# Patient Record
Sex: FEMALE | Race: BLACK OR AFRICAN AMERICAN | NOT HISPANIC OR LATINO | URBAN - METROPOLITAN AREA
[De-identification: names, ages, dates, MRNs, and addresses within clinical notes are randomized per-mention and may not be internally consistent; named-entity substitution may affect disease eponyms.]

---

## 2022-04-07 ENCOUNTER — HISTORICAL (OUTPATIENT)
Dept: ADMINISTRATIVE | Facility: HOSPITAL | Age: 22
End: 2022-04-07

## 2022-04-24 VITALS
BODY MASS INDEX: 22.19 KG/M2 | DIASTOLIC BLOOD PRESSURE: 79 MMHG | HEIGHT: 65 IN | WEIGHT: 133.19 LBS | SYSTOLIC BLOOD PRESSURE: 117 MMHG

## 2022-09-16 DIAGNOSIS — R79.89 ELEVATED TSH: Primary | ICD-10-CM

## 2022-09-22 ENCOUNTER — OFFICE VISIT (OUTPATIENT)
Dept: OTOLARYNGOLOGY | Facility: CLINIC | Age: 22
End: 2022-09-22
Payer: MEDICAID

## 2022-09-22 VITALS
BODY MASS INDEX: 23.13 KG/M2 | HEART RATE: 82 BPM | TEMPERATURE: 99 F | DIASTOLIC BLOOD PRESSURE: 84 MMHG | SYSTOLIC BLOOD PRESSURE: 128 MMHG | WEIGHT: 139 LBS

## 2022-09-22 DIAGNOSIS — R79.89 ELEVATED TSH: ICD-10-CM

## 2022-09-22 PROCEDURE — 99213 OFFICE O/P EST LOW 20 MIN: CPT | Mod: PBBFAC | Performed by: OTOLARYNGOLOGY

## 2022-09-22 RX ORDER — NORGESTIMATE AND ETHINYL ESTRADIOL 0.25-0.035
1 KIT ORAL DAILY
COMMUNITY
Start: 2022-09-20

## 2022-09-26 ENCOUNTER — LAB VISIT (OUTPATIENT)
Dept: LAB | Facility: HOSPITAL | Age: 22
End: 2022-09-26
Attending: OTOLARYNGOLOGY
Payer: MEDICAID

## 2022-09-26 DIAGNOSIS — R79.89 ELEVATED TSH: ICD-10-CM

## 2022-09-26 LAB
T3FREE SERPL-MCNC: 2.52 PG/ML (ref 1.57–3.91)
T4 FREE SERPL-MCNC: 0.81 NG/DL (ref 0.7–1.48)
TSH SERPL-ACNC: 0.35 UIU/ML (ref 0.35–4.94)

## 2022-09-26 PROCEDURE — 84443 ASSAY THYROID STIM HORMONE: CPT

## 2022-09-26 PROCEDURE — 84439 ASSAY OF FREE THYROXINE: CPT

## 2022-09-26 PROCEDURE — 84481 FREE ASSAY (FT-3): CPT

## 2022-09-26 PROCEDURE — 36415 COLL VENOUS BLD VENIPUNCTURE: CPT

## 2022-09-26 NOTE — PROGRESS NOTES
Patient Name: oSfía Sunshine   YOB: 2000     Chief Complaint:   Chief Complaint   Patient presents with    referral for elevated tsh        History of Present Illness:  September 22, 2022:   22-year-old female referred to ENT Clinic for evaluation of a low TSH level.  Reviewing referral mentioned was made referring both to ENT and endocrinology.  The patient had lab work done on June 8, 2022, which showed her to have a low TSH level of 0.373.  Repeat lab work done on July 1, 2022 showed the TSH to be 0.448 (normal 0.450-4.50) with a normal T4, T3 uptake, and thyroxine index.  Patient is asymptomatic.  She has no change in her appetite, weight change, palpitations, unusual anxiety, or malaise.  No family history of thyroid dysfunction.    Past Medical History:  History reviewed. No pertinent past medical history.  History reviewed. No pertinent surgical history.    Review of Systems:  Unremarkable except as mentioned above.    Current Medications:  Current Outpatient Medications   Medication Sig    ESTARYLLA 0.25-35 mg-mcg per tablet Take 1 tablet by mouth once daily.     No current facility-administered medications for this visit.        Allergies:  Review of patient's allergies indicates:  No Known Allergies     Physical Exam:  Vital signs:   Vitals:    09/22/22 1218   BP: 128/84   Pulse: 82   Temp: 99.4 °F (37.4 °C)   Weight: 63 kg (139 lb)   General:  Well-developed well-nourished female in no acute distress.  Voice is normal.  Head and face:  Normocephalic.  No facial lesions.  No temporomandibular joint tenderness or click.  Ears:  Right ear-auricle is normally developed.  External auditory canal is clear.  Tympanic membrane is nonerythematous.  No middle ear effusion.  Left ear-auricle is normally developed.  External auditory canal is clear.  Tympanic membrane is nonerythematous.  No middle ear effusion.  Nose:  Nasal dorsum is unremarkable.  No significant septal deviation.  No  significant intranasal congestion.  Secretions are clear.  Oral cavity and oropharynx:  Tongue and floor mouth are without lesions.  Mucosa is moist.  No pharyngeal erythema or exudates.  No oropharyngeal masses.  No tonsillar hypertrophy.  Neck:  Supple without adenopathy or thyromegaly.  Trachea is in the midline.  Parotid and submandibular glands are normal to palpation without masses or tenderness.  Eyes:  Extraocular muscles intact.  No nystagmus.  No exophthalmos or enophthalmos.  Neurologic:  Alert and oriented.  Cranial nerves 2-12 are grossly normal.        Assessment/Plan:  22-year-old female with abnormal TSH level who is asymptomatic for hyperthyroidism.      Plan:  We will recheck TSH, free T3, and free T4.  If they are abnormal we will then refer to endocrinology. Telemedicine one week to review results..      Dino Anthony M.D.

## 2024-04-07 ENCOUNTER — HOSPITAL ENCOUNTER (EMERGENCY)
Facility: HOSPITAL | Age: 24
Discharge: HOME | End: 2024-04-07
Attending: STUDENT IN AN ORGANIZED HEALTH CARE EDUCATION/TRAINING PROGRAM
Payer: COMMERCIAL

## 2024-04-07 VITALS
OXYGEN SATURATION: 100 % | HEART RATE: 100 BPM | SYSTOLIC BLOOD PRESSURE: 132 MMHG | WEIGHT: 135 LBS | HEIGHT: 65 IN | DIASTOLIC BLOOD PRESSURE: 79 MMHG | RESPIRATION RATE: 18 BRPM | BODY MASS INDEX: 22.49 KG/M2 | TEMPERATURE: 99 F

## 2024-04-07 DIAGNOSIS — S00.83XA CONTUSION OF FACE, INITIAL ENCOUNTER: ICD-10-CM
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
## 2024-04-07 PROCEDURE — 99284 EMERGENCY DEPT VISIT MOD MDM: CPT | Mod: 25

## 2024-04-07 RX ORDER — IBUPROFEN 600 MG/1
600 TABLET ORAL EVERY 6 HOURS PRN
Qty: 15 TABLET | Refills: 0 | Status: SHIPPED | OUTPATIENT
Start: 2024-04-07 | End: 2024-04-12

## 2024-04-07 RX ORDER — CYCLOBENZAPRINE HCL 10 MG
10 TABLET ORAL 3 TIMES DAILY PRN
Qty: 20 TABLET | Refills: 0 | Status: SHIPPED | OUTPATIENT
Start: 2024-04-07 | End: 2024-04-17
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
## 2024-04-07 NOTE — DISCHARGE INSTRUCTIONS
Follow-up with the primary care physician.      You may take ibuprofen and Tylenol as needed for pain.  If your pain is unrelieved you may take Flexeril.  Do not drive or operate machinery while taking this medication.      You will likely be more sore tomorrow.  Plenty of fluids and plenty of rest.      Return to emergency department for any new or worsening symptoms, nausea, vomiting, difficulty breathing, fever, headache, or any other concerns.
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
